# Patient Record
Sex: FEMALE | Race: WHITE | NOT HISPANIC OR LATINO | Employment: FULL TIME | URBAN - METROPOLITAN AREA
[De-identification: names, ages, dates, MRNs, and addresses within clinical notes are randomized per-mention and may not be internally consistent; named-entity substitution may affect disease eponyms.]

---

## 2018-10-12 ENCOUNTER — TELEPHONE (OUTPATIENT)
Dept: NEUROLOGY | Facility: CLINIC | Age: 48
End: 2018-10-12

## 2018-10-12 NOTE — TELEPHONE ENCOUNTER
Returned patients call in regards to new patient appointment. Informed patient that our next available new patient is not until March 2019. Patient understood and decided to look around and she will call us back if she does not find anything. Patient was placed on the waiting list just in case.

## 2018-10-12 NOTE — TELEPHONE ENCOUNTER
----- Message from Ian Adam sent at 10/12/2018  3:10 PM CDT -----  Type:  Sooner Apoointment Request    Caller is requesting a sooner appointment.  Caller declined first available appointment listed below.  Caller will not accept being placed on the waitlist and is requesting a message be sent to doctor.    Name of Caller:  Patient  When is the first available appointment?  na  Symptoms: migraines   Best Call Back Number:  [phone]  Additional Information:  New patient needs to schedule appointment

## 2018-11-06 PROBLEM — N84.0 ENDOMETRIAL POLYP: Status: ACTIVE | Noted: 2018-11-06

## 2023-02-14 PROBLEM — K80.50 CHOLEDOCHOLITHIASIS: Status: ACTIVE | Noted: 2023-01-09

## 2023-07-09 PROBLEM — R94.30 NONSPECIFIC ABNORMAL FUNCTION STUDY, CARDIOVASCULAR: Status: ACTIVE | Noted: 2023-07-09
